# Patient Record
Sex: MALE | Race: WHITE | URBAN - METROPOLITAN AREA
[De-identification: names, ages, dates, MRNs, and addresses within clinical notes are randomized per-mention and may not be internally consistent; named-entity substitution may affect disease eponyms.]

---

## 2020-07-21 ENCOUNTER — OFFICE VISIT (OUTPATIENT)
Dept: INTERNAL MEDICINE CLINIC | Age: 45
End: 2020-07-21
Payer: COMMERCIAL

## 2020-07-21 VITALS
SYSTOLIC BLOOD PRESSURE: 120 MMHG | HEIGHT: 74 IN | TEMPERATURE: 97.6 F | WEIGHT: 188.2 LBS | OXYGEN SATURATION: 98 % | BODY MASS INDEX: 24.15 KG/M2 | DIASTOLIC BLOOD PRESSURE: 62 MMHG | RESPIRATION RATE: 14 BRPM | HEART RATE: 68 BPM

## 2020-07-21 PROCEDURE — 90471 IMMUNIZATION ADMIN: CPT | Performed by: INTERNAL MEDICINE

## 2020-07-21 PROCEDURE — 90715 TDAP VACCINE 7 YRS/> IM: CPT | Performed by: INTERNAL MEDICINE

## 2020-07-21 PROCEDURE — 99204 OFFICE O/P NEW MOD 45 MIN: CPT | Performed by: INTERNAL MEDICINE

## 2020-07-21 RX ORDER — NAPROXEN 500 MG/1
500 TABLET ORAL 2 TIMES DAILY WITH MEALS
Qty: 60 TABLET | Refills: 0 | Status: SHIPPED | OUTPATIENT
Start: 2020-07-21 | End: 2020-08-17

## 2020-07-21 SDOH — HEALTH STABILITY: MENTAL HEALTH: HOW MANY STANDARD DRINKS CONTAINING ALCOHOL DO YOU HAVE ON A TYPICAL DAY?: 3 OR 4

## 2020-07-21 SDOH — HEALTH STABILITY: MENTAL HEALTH: HOW OFTEN DO YOU HAVE A DRINK CONTAINING ALCOHOL?: 2-3 TIMES A WEEK

## 2020-07-21 ASSESSMENT — ENCOUNTER SYMPTOMS
NAUSEA: 0
PHOTOPHOBIA: 0
SINUS PRESSURE: 0
DIARRHEA: 0
COUGH: 0
EYE PAIN: 0
VOMITING: 0
WHEEZING: 0
SHORTNESS OF BREATH: 0
BACK PAIN: 0
CONSTIPATION: 0
COLOR CHANGE: 0
SINUS PAIN: 0
TROUBLE SWALLOWING: 0
ABDOMINAL PAIN: 0

## 2020-07-21 ASSESSMENT — PATIENT HEALTH QUESTIONNAIRE - PHQ9
1. LITTLE INTEREST OR PLEASURE IN DOING THINGS: 0
SUM OF ALL RESPONSES TO PHQ QUESTIONS 1-9: 0
SUM OF ALL RESPONSES TO PHQ QUESTIONS 1-9: 0
2. FEELING DOWN, DEPRESSED OR HOPELESS: 0
SUM OF ALL RESPONSES TO PHQ9 QUESTIONS 1 & 2: 0

## 2020-07-21 NOTE — PROGRESS NOTES
2020    Raleigh Llamas (:  1975) is a 39 y.o. male, here for evaluation of the following medical concerns and for CPE:    HPI    Bilateral elbow pain, numbness in lateral 2 digits. Lateral elbow. Bothering over last 2 months. Numbness only at night. Elbow pain not with activity but once he stops and rests. Occasionally takes aleve > has been seeing the chiropractor for it. Uses counterforce bracing some times. Doing stretches. Has been playing more golf lately and lifting more weights. Right thumb pain and swelling at 1st MCP. Mostly just sore. No redness. He is left handed. No other joints swollen or painful. Review of Systems   Constitutional: Negative for appetite change, fatigue, fever and unexpected weight change. No night sweats   HENT: Negative for congestion, ear pain, hearing loss, nosebleeds, sinus pressure, sinus pain, sneezing, tinnitus and trouble swallowing. Eyes: Negative for photophobia, pain and visual disturbance. Respiratory: Negative for cough, shortness of breath and wheezing. Cardiovascular: Negative for chest pain, palpitations and leg swelling. Gastrointestinal: Negative for abdominal pain, constipation, diarrhea, nausea and vomiting. Endocrine: Negative for cold intolerance, heat intolerance, polydipsia, polyphagia and polyuria. Genitourinary: Negative for difficulty urinating, dysuria, frequency, hematuria and urgency. Musculoskeletal: Positive for arthralgias and joint swelling. Negative for back pain, myalgias and neck pain. Skin: Negative for color change and rash. Allergic/Immunologic: Negative for environmental allergies, food allergies and immunocompromised state. Neurological: Negative for dizziness, syncope, speech difficulty, weakness, light-headedness, numbness and headaches. Hematological: Negative for adenopathy. Does not bruise/bleed easily. Psychiatric/Behavioral: Negative for dysphoric mood and sleep disturbance.  The patient is not nervous/anxious. Prior to Visit Medications    Not on File        No Known Allergies    History reviewed. No pertinent past medical history.     Past Surgical History:   Procedure Laterality Date    KNEE SURGERY Right 10/2014    meniscus    SHOULDER SURGERY Right 03/2014       Social History     Socioeconomic History    Marital status:      Spouse name: Not on file    Number of children: Not on file    Years of education: Not on file    Highest education level: Not on file   Occupational History    Not on file   Social Needs    Financial resource strain: Not on file    Food insecurity     Worry: Not on file     Inability: Not on file    Transportation needs     Medical: Not on file     Non-medical: Not on file   Tobacco Use    Smoking status: Never Smoker    Smokeless tobacco: Never Used   Substance and Sexual Activity    Alcohol use: Yes     Frequency: 2-3 times a week     Drinks per session: 3 or 4    Drug use: Never    Sexual activity: Yes     Partners: Female     Birth control/protection: Surgical   Lifestyle    Physical activity     Days per week: Not on file     Minutes per session: Not on file    Stress: Not on file   Relationships    Social connections     Talks on phone: Not on file     Gets together: Not on file     Attends Taoist service: Not on file     Active member of club or organization: Not on file     Attends meetings of clubs or organizations: Not on file     Relationship status: Not on file    Intimate partner violence     Fear of current or ex partner: Not on file     Emotionally abused: Not on file     Physically abused: Not on file     Forced sexual activity: Not on file   Other Topics Concern    Not on file   Social History Narrative    Not on file        Family History   Problem Relation Age of Onset    Hypertension Father     High Cholesterol Father     Alzheimer's Disease Paternal Grandmother        Vitals:    07/21/20 1515   BP: 120/62   Pulse: 68   Resp: 14   Temp: 97.6 °F (36.4 °C)   TempSrc: Temporal   SpO2: 98%   Weight: 188 lb 3.2 oz (85.4 kg)   Height: 6' 2\" (1.88 m)     Estimated body mass index is 24.16 kg/m² as calculated from the following:    Height as of this encounter: 6' 2\" (1.88 m). Weight as of this encounter: 188 lb 3.2 oz (85.4 kg). Physical Exam  Constitutional:       General: He is not in acute distress. Appearance: Normal appearance. HENT:      Head: Normocephalic and atraumatic. Right Ear: Tympanic membrane, ear canal and external ear normal.      Left Ear: Tympanic membrane, ear canal and external ear normal.      Nose: Nose normal. No congestion. Mouth/Throat:      Mouth: Mucous membranes are moist.      Pharynx: Oropharynx is clear. Eyes:      General: No scleral icterus. Conjunctiva/sclera: Conjunctivae normal.   Neck:      Musculoskeletal: Normal range of motion and neck supple. Vascular: No carotid bruit. Comments: No thyromegaly  Cardiovascular:      Rate and Rhythm: Normal rate and regular rhythm. Pulses: Normal pulses. Heart sounds: Normal heart sounds. No murmur. No friction rub. No gallop. Pulmonary:      Effort: Pulmonary effort is normal.      Breath sounds: Normal breath sounds. No wheezing, rhonchi or rales. Abdominal:      General: Abdomen is flat. Bowel sounds are normal.      Palpations: Abdomen is soft. Musculoskeletal: Normal range of motion. General: Tenderness (over lateral epicondyles) present. Right lower leg: No edema. Left lower leg: No edema. Comments: 1st MCP on right with slight swelling. Not TTP. Some mild grind. Lymphadenopathy:      Cervical: No cervical adenopathy. Skin:     General: Skin is warm and dry. Findings: No rash. Neurological:      General: No focal deficit present. Mental Status: He is alert. Mental status is at baseline.       Coordination: Coordination normal.      Gait: Gait normal.   Psychiatric:         Mood and Affect: Mood normal.         Behavior: Behavior normal.         ASSESSMENT/PLAN:  Tanisha Edwards was seen today for established new doctor. Diagnoses and all orders for this visit:    Lateral epicondylitis of both elbows  Trial 10 day course of NSAID, counterforce bracing for 4-6 weeks, eccentric exercises once pain free. Consider ortho referral if continues to bother for possible CSI. Numbness likely ulnar entrapment. Discussed keeping elbows straight in sleep.   -     naproxen (NAPROSYN) 500 MG tablet; Take 1 tablet by mouth 2 times daily (with meals)    Screening for diabetes mellitus (DM)  -     Cancel: Glucose, Fasting; Future    Encounter for lipid screening for cardiovascular disease  -     Lipid Panel; Future    Need for Tdap vaccination  -     Tdap (age 6y and older) IM (BOOSTRIX)    Vitamin D deficiency  -     Vitamin D 25 Hydroxy; Future    Leukopenia, unspecified type  Previously known  -     COMPREHENSIVE METABOLIC PANEL; Future  -     CBC Auto Differential; Future    Right Thumb Pain  Likely arthritis in setting of distant injury. If worsens will xray. Trial NSAIDs. Update Tdap    Annual physical exam  UTD on HM  Counseling done    Return in about 1 year (around 7/21/2021) for Physical.    An  electronic signature was used to authenticate this note.     --Charley Fleming MD on 7/21/2020 at 3:28 PM

## 2020-07-21 NOTE — PATIENT INSTRUCTIONS
Eccentric strengthening exercises for the tennis elbow    Naprosyn 500mg twice a day for 10 days.      Counter force brace

## 2020-07-28 DIAGNOSIS — E55.9 VITAMIN D DEFICIENCY: ICD-10-CM

## 2020-07-28 DIAGNOSIS — Z13.6 ENCOUNTER FOR LIPID SCREENING FOR CARDIOVASCULAR DISEASE: ICD-10-CM

## 2020-07-28 DIAGNOSIS — D72.819 LEUKOPENIA, UNSPECIFIED TYPE: ICD-10-CM

## 2020-07-28 DIAGNOSIS — Z13.220 ENCOUNTER FOR LIPID SCREENING FOR CARDIOVASCULAR DISEASE: ICD-10-CM

## 2020-07-28 LAB
A/G RATIO: 1.7 (ref 1.1–2.2)
ALBUMIN SERPL-MCNC: 4.4 G/DL (ref 3.4–5)
ALP BLD-CCNC: 69 U/L (ref 40–129)
ALT SERPL-CCNC: 27 U/L (ref 10–40)
ANION GAP SERPL CALCULATED.3IONS-SCNC: 19 MMOL/L (ref 3–16)
AST SERPL-CCNC: 27 U/L (ref 15–37)
BASOPHILS ABSOLUTE: 0 K/UL (ref 0–0.2)
BASOPHILS RELATIVE PERCENT: 1.3 %
BILIRUB SERPL-MCNC: 0.7 MG/DL (ref 0–1)
BUN BLDV-MCNC: 22 MG/DL (ref 7–20)
CALCIUM SERPL-MCNC: 9.5 MG/DL (ref 8.3–10.6)
CHLORIDE BLD-SCNC: 101 MMOL/L (ref 99–110)
CHOLESTEROL, TOTAL: 167 MG/DL (ref 0–199)
CO2: 21 MMOL/L (ref 21–32)
CREAT SERPL-MCNC: 1.2 MG/DL (ref 0.9–1.3)
EOSINOPHILS ABSOLUTE: 0.2 K/UL (ref 0–0.6)
EOSINOPHILS RELATIVE PERCENT: 6.9 %
GFR AFRICAN AMERICAN: >60
GFR NON-AFRICAN AMERICAN: >60
GLOBULIN: 2.6 G/DL
GLUCOSE BLD-MCNC: 82 MG/DL (ref 70–99)
HCT VFR BLD CALC: 44.7 % (ref 40.5–52.5)
HDLC SERPL-MCNC: 65 MG/DL (ref 40–60)
HEMOGLOBIN: 15.3 G/DL (ref 13.5–17.5)
LDL CHOLESTEROL CALCULATED: 83 MG/DL
LYMPHOCYTES ABSOLUTE: 1.3 K/UL (ref 1–5.1)
LYMPHOCYTES RELATIVE PERCENT: 41 %
MCH RBC QN AUTO: 32.8 PG (ref 26–34)
MCHC RBC AUTO-ENTMCNC: 34.1 G/DL (ref 31–36)
MCV RBC AUTO: 96.1 FL (ref 80–100)
MONOCYTES ABSOLUTE: 0.3 K/UL (ref 0–1.3)
MONOCYTES RELATIVE PERCENT: 9.5 %
NEUTROPHILS ABSOLUTE: 1.3 K/UL (ref 1.7–7.7)
NEUTROPHILS RELATIVE PERCENT: 41.3 %
PDW BLD-RTO: 12.9 % (ref 12.4–15.4)
PLATELET # BLD: 176 K/UL (ref 135–450)
PMV BLD AUTO: 8.7 FL (ref 5–10.5)
POTASSIUM SERPL-SCNC: 4.5 MMOL/L (ref 3.5–5.1)
RBC # BLD: 4.65 M/UL (ref 4.2–5.9)
SODIUM BLD-SCNC: 141 MMOL/L (ref 136–145)
TOTAL PROTEIN: 7 G/DL (ref 6.4–8.2)
TRIGL SERPL-MCNC: 93 MG/DL (ref 0–150)
VITAMIN D 25-HYDROXY: 54.6 NG/ML
VLDLC SERPL CALC-MCNC: 19 MG/DL
WBC # BLD: 3.1 K/UL (ref 4–11)

## 2020-07-29 DIAGNOSIS — Z11.4 ENCOUNTER FOR SCREENING FOR HIV: ICD-10-CM

## 2020-07-29 LAB
HIV AG/AB: NORMAL
HIV ANTIGEN: NORMAL
HIV-1 ANTIBODY: NORMAL
HIV-2 AB: NORMAL

## 2020-11-12 ENCOUNTER — TELEPHONE (OUTPATIENT)
Dept: INTERNAL MEDICINE CLINIC | Age: 45
End: 2020-11-12

## 2020-11-12 DIAGNOSIS — Z20.822 SUSPECTED COVID-19 VIRUS INFECTION: ICD-10-CM

## 2020-11-12 LAB — SARS-COV-2 ANTIBODY, TOTAL: NEGATIVE

## 2020-11-13 ENCOUNTER — OFFICE VISIT (OUTPATIENT)
Dept: PRIMARY CARE CLINIC | Age: 45
End: 2020-11-13
Payer: COMMERCIAL

## 2020-11-13 PROCEDURE — 99211 OFF/OP EST MAY X REQ PHY/QHP: CPT | Performed by: NURSE PRACTITIONER

## 2020-11-13 NOTE — PROGRESS NOTES
Deisy Cohen received a viral test for COVID-19. They were educated on isolation and quarantine as appropriate. For any symptoms, they were directed to seek care from their PCP, given contact information to establish with a doctor, directed to an urgent care or the emergency room.

## 2020-11-17 LAB — SARS-COV-2, NAA: NOT DETECTED

## 2021-05-05 DIAGNOSIS — M77.12 LATERAL EPICONDYLITIS OF BOTH ELBOWS: ICD-10-CM

## 2021-05-05 DIAGNOSIS — M77.11 LATERAL EPICONDYLITIS OF BOTH ELBOWS: ICD-10-CM

## 2021-05-05 NOTE — TELEPHONE ENCOUNTER
Last appointment: 7/21/2020  Next appointment: Per note patient ws due to follow up in 1 year  Last refill: 62 with 3 08/17/2020

## 2021-05-06 RX ORDER — NAPROXEN 500 MG/1
TABLET ORAL
Qty: 60 TABLET | Refills: 3 | Status: SHIPPED | OUTPATIENT
Start: 2021-05-06 | End: 2022-11-03

## 2022-11-03 ENCOUNTER — HOSPITAL ENCOUNTER (OUTPATIENT)
Dept: GENERAL RADIOLOGY | Age: 47
Discharge: HOME | End: 2022-11-03
Payer: COMMERCIAL

## 2022-11-03 ENCOUNTER — OFFICE VISIT (OUTPATIENT)
Dept: INTERNAL MEDICINE CLINIC | Age: 47
End: 2022-11-03
Payer: COMMERCIAL

## 2022-11-03 VITALS
DIASTOLIC BLOOD PRESSURE: 72 MMHG | HEART RATE: 54 BPM | SYSTOLIC BLOOD PRESSURE: 118 MMHG | RESPIRATION RATE: 16 BRPM | OXYGEN SATURATION: 98 % | WEIGHT: 200.6 LBS | HEIGHT: 74 IN | BODY MASS INDEX: 25.74 KG/M2

## 2022-11-03 DIAGNOSIS — R05.3 CHRONIC COUGH: ICD-10-CM

## 2022-11-03 DIAGNOSIS — Z11.59 ENCOUNTER FOR HEPATITIS C SCREENING TEST FOR LOW RISK PATIENT: ICD-10-CM

## 2022-11-03 DIAGNOSIS — Z12.11 COLON CANCER SCREENING: ICD-10-CM

## 2022-11-03 DIAGNOSIS — L82.1 SEBORRHEIC KERATOSES: ICD-10-CM

## 2022-11-03 DIAGNOSIS — Z23 NEED FOR VACCINATION: ICD-10-CM

## 2022-11-03 DIAGNOSIS — R09.89 TICKLE IN THROAT: ICD-10-CM

## 2022-11-03 DIAGNOSIS — Z00.00 ENCOUNTER FOR WELL ADULT EXAM WITHOUT ABNORMAL FINDINGS: Primary | ICD-10-CM

## 2022-11-03 DIAGNOSIS — Z13.220 ENCOUNTER FOR LIPID SCREENING FOR CARDIOVASCULAR DISEASE: ICD-10-CM

## 2022-11-03 DIAGNOSIS — Z13.1 SCREENING FOR DIABETES MELLITUS (DM): ICD-10-CM

## 2022-11-03 DIAGNOSIS — J31.2 CHRONIC SORE THROAT: ICD-10-CM

## 2022-11-03 DIAGNOSIS — Z13.6 ENCOUNTER FOR LIPID SCREENING FOR CARDIOVASCULAR DISEASE: ICD-10-CM

## 2022-11-03 PROCEDURE — 90471 IMMUNIZATION ADMIN: CPT | Performed by: INTERNAL MEDICINE

## 2022-11-03 PROCEDURE — G8482 FLU IMMUNIZE ORDER/ADMIN: HCPCS | Performed by: INTERNAL MEDICINE

## 2022-11-03 PROCEDURE — 71046 X-RAY EXAM CHEST 2 VIEWS: CPT

## 2022-11-03 PROCEDURE — 90674 CCIIV4 VAC NO PRSV 0.5 ML IM: CPT | Performed by: INTERNAL MEDICINE

## 2022-11-03 PROCEDURE — 99396 PREV VISIT EST AGE 40-64: CPT | Performed by: INTERNAL MEDICINE

## 2022-11-03 ASSESSMENT — PATIENT HEALTH QUESTIONNAIRE - PHQ9
SUM OF ALL RESPONSES TO PHQ9 QUESTIONS 1 & 2: 0
SUM OF ALL RESPONSES TO PHQ QUESTIONS 1-9: 0
2. FEELING DOWN, DEPRESSED OR HOPELESS: 0
SUM OF ALL RESPONSES TO PHQ QUESTIONS 1-9: 0
1. LITTLE INTEREST OR PLEASURE IN DOING THINGS: 0

## 2022-11-03 NOTE — PATIENT INSTRUCTIONS
Well Visit, Ages 25 to 72: Care Instructions  Well visits can help you stay healthy. Your doctor has checked your overall health and may have suggested ways to take good care of yourself. Your doctor also may have recommended tests. You can help prevent illness with healthy eating, good sleep, vaccinations, regular exercise, and other steps. Get the tests that you and your doctor decide on. Depending on your age and risks, examples might include screening for diabetes; hepatitis C; HIV; and cervical, breast, lung, and colon cancer. Screening helps find diseases before any symptoms appear. Eat healthy foods. Choose fruits, vegetables, whole grains, lean protein, and low-fat dairy foods. Limit saturated fat and reduce salt. Limit alcohol. Men should have no more than 2 drinks a day. Women should have no more than 1. For some people, no alcohol is the best choice. Exercise. Get at least 30 minutes of exercise on most days of the week. Walking can be a good choice. Reach and stay at your healthy weight. This will lower your risk for many health problems. Take care of your mental health. Try to stay connected with friends, family, and community, and find ways to manage stress. If you're feeling depressed or hopeless, talk to someone. A counselor can help. If you don't have a counselor, talk to your doctor. Talk to your doctor if you think you may have a problem with alcohol or drug use. This includes prescription medicines and illegal drugs. Avoid tobacco and nicotine: Don't smoke, vape, or chew. If you need help quitting, talk to your doctor. Practice safer sex. Getting tested, using condoms or dental dams, and limiting sex partners can help prevent STIs. Use birth control if it's important to you to prevent pregnancy. Talk with your doctor about your choices and what might be best for you. Prevent problems where you can.  Protect your skin from too much sun, wash your hands, brush your teeth twice a day, and wear a seat belt in the car. Where can you learn more? Go to https://[email or web address] org and sign in to your [employer] account. Enter P072 in the [facility] box to learn more about \"Well Visit, Ages 25 to 72: Care Instructions. \"     If you do not have an account, please click on the \"Sign Up Now\" link. Current as of: March 9, 2022               Content Version: 13.4  © 2332-3654 [employer]. Care instructions adapted under license by [facility] ([facility]). If you have questions about a medical condition or this instruction, always ask your healthcare professional. [address] any warranty or liability for your use of this information.

## 2022-11-03 NOTE — PROGRESS NOTES
History and Physical  [facility]  Internal Medicine  MD Leydi Elliott Harsh  YOB: 1975    Date of Service:  11/3/2022    Vitals:    11/03/22 0821   BP: 118/72   Pulse: 54   Resp: 16   SpO2: 98%   Weight: 200 lb 9.6 oz (91 kg)   Height: 6' 2\" (1.88 m)     Body mass index is 25.76 kg/m². Wt Readings from Last 3 Encounters:   11/03/22 200 lb 9.6 oz (91 kg)   07/21/20 188 lb 3.2 oz (85.4 kg)     BP Readings from Last 3 Encounters:   11/03/22 118/72   07/21/20 120/62        Chief Sahnte Mcbride is a 41 y.o. male who presents for complete physical examination. Chief Complaint   Patient presents with    Annual Exam       HPI:   Every day getting a little scratch in throat which will make him cough. Started Jun 2022 after being sick. Does not wake up at night. Exercise tolerance okay. Breathing fine. No postnasal drip. No history of asthma. There is no problem list on file for this patient. No Known Allergies  No outpatient medications have been marked as taking for the 11/3/22 encounter (Office Visit) with Mohit Hendrickson MD.       History reviewed. No pertinent past medical history. Past Surgical History:   Procedure Laterality Date    KNEE SURGERY Right 10/2014    meniscus    SHOULDER SURGERY Right 03/2014     Family History   Problem Relation Age of Onset    Hypertension Father     High Cholesterol Father     Alzheimer's Disease Paternal Grandmother        Review of Systems:  A comprehensive review of systems was negative except for what was noted in the HPI. Physical Exam  Constitutional:       General: He is not in acute distress. Appearance: Normal appearance. He is well-developed. He is not diaphoretic. HENT:      Head: Normocephalic and atraumatic.       Right Ear: Tympanic membrane, ear canal and external ear normal.      Left Ear: Tympanic membrane, ear canal and external ear normal.      Nose: Nose normal.      Mouth/Throat:      Mouth: Mucous membranes are moist.      Pharynx: Oropharynx is clear. Posterior oropharyngeal erythema (mild) present. No oropharyngeal exudate. Eyes:      General: No scleral icterus. Conjunctiva/sclera: Conjunctivae normal.      Pupils: Pupils are equal, round, and reactive to light. Neck:      Thyroid: No thyromegaly. Vascular: No carotid bruit or JVD. Comments: No JVD at 90 deg  No thyromegaly  Cardiovascular:      Rate and Rhythm: Normal rate and regular rhythm. Pulses: Normal pulses. Heart sounds: Normal heart sounds. No murmur heard. No friction rub. No gallop. Pulmonary:      Effort: Pulmonary effort is normal.      Breath sounds: Normal breath sounds. No wheezing or rales. Abdominal:      General: Bowel sounds are normal. There is no distension. Palpations: Abdomen is soft. There is no mass. Tenderness: There is no abdominal tenderness. There is no guarding or rebound. Musculoskeletal:         General: No tenderness or deformity. Normal range of motion. Cervical back: Normal range of motion and neck supple. Right lower leg: No edema. Left lower leg: No edema. Lymphadenopathy:      Cervical: No cervical adenopathy. Skin:     General: Skin is warm and dry. Findings: No lesion or rash. Comments: A few scattered seb kers on face   Neurological:      General: No focal deficit present. Mental Status: He is alert and oriented to person, place, and time. Cranial Nerves: No cranial nerve deficit (grossly intact). Sensory: No sensory deficit. Motor: No weakness.       Coordination: Coordination normal.      Gait: Gait normal.   Psychiatric:         Mood and Affect: Mood normal.         Behavior: Behavior normal.       Lipid panel:   Lab Results   Component Value Date    CHOL 167 07/28/2020    TRIG 93 07/28/2020    HDL 65 (H) 07/28/2020    LDLCALC 83 07/28/2020     Glucose:   Glucose (mg/dL)   Date Value   07/28/2020 82       No results found for this visit on 11/03/22. Preventive Care:  Self-testicular exams: yes  Eye exam within the past 2 years: yes  Dental exam every 6months: yes  Exercise:  5 days a week for 30 mins to an hour - various sport activities. Social History     Tobacco Use    Smoking status: Never    Smokeless tobacco: Never   Substance Use Topics    Alcohol use: Yes     Social History     Substance and Sexual Activity   Sexual Activity Yes    Partners: Female    Birth control/protection: Surgical       Advance Directive: N, <no information>    Immunization History   Administered Date(s) Administered    COVID-19, PFIZER Bivalent BOOSTER, (age 12y+), IM, 27 mcg/0.3 mL dose 09/12/2022    COVID-19, PFIZER PURPLE top, DILUTE for use, (age 15 y+), 30mcg/0.3mL 03/20/2021, 04/11/2021    Influenza, FLUCELVAX, (age 10 mo+), MDCK, PF, 0.5mL 11/03/2022    Tdap (Boostrix, Adacel) 07/21/2020       Health Maintenance   Topic Date Due    Depression Screen  Never done    Hepatitis C screen  Never done    Colorectal Cancer Screen  Never done    Lipids  07/28/2025    DTaP/Tdap/Td vaccine (3 - Td or Tdap) 07/21/2030    Flu vaccine  Completed    COVID-19 Vaccine  Completed    Hepatitis A vaccine  Aged Out    Hib vaccine  Aged Out    Meningococcal (ACWY) vaccine  Aged Out    Pneumococcal 0-64 years Vaccine  Aged Out    HIV screen  Discontinued          Assessment/Plan:  1. Encounter for well adult exam without abnormal findings  Care gaps identified and addressed  UTD on IMZ - flu shot today  Hep C screening  Lipids and A1C screening  Colon cancer screening discussed - colonoscopy referral  Mental health screenings performed  Sun protection  Healthy diet and exercise    -     Hepatitis C Antibody; Future  -     CBC with Auto Differential; Future  -     Comprehensive Metabolic Panel; Future  -     Lipid Panel; Future  -     Hemoglobin A1C; Future  2.  Encounter for hepatitis C screening test for low risk patient  -     Hepatitis C Antibody; Future  3. Encounter for lipid screening for cardiovascular disease  -     Lipid Panel; Future  4. Screening for diabetes mellitus (DM)  -     Hemoglobin A1C; Future  5. Colon cancer screening  -     [facility] Merlin, MD, Gastroenterology, [facility]  6. Chronic cough  -     XR CHEST STANDARD (2 VW); Future  7. Seborrheic keratoses  -     Lilyan Collet MD, Dermatology, [facility]  8. Need for vaccination  -     Influenza, FLUCELVAX, (age 10 mo+), IM, PF, 0.5 mL  9. Chronic sore throat  Comments:  Since illness. May have post viral cough. Some erythema on exam. May have silent reflux. Will get CXR and refer to ENT for eval.   Orders:  -     Oscar Hatch DO, Otolaryngology, [facility]  10.  Tickle in throat  -     [facility] Sly Maher DO, Otolaryngology, [facility]     Return in about 1 year (around 11/3/2023) for Physical.

## 2022-12-02 ENCOUNTER — OFFICE VISIT (OUTPATIENT)
Dept: ENT CLINIC | Age: 47
End: 2022-12-02

## 2022-12-02 VITALS
BODY MASS INDEX: 25.41 KG/M2 | WEIGHT: 198 LBS | HEIGHT: 74 IN | SYSTOLIC BLOOD PRESSURE: 118 MMHG | HEART RATE: 61 BPM | DIASTOLIC BLOOD PRESSURE: 77 MMHG

## 2022-12-02 DIAGNOSIS — R09.89 FOREIGN BODY SENSATION IN THROAT: ICD-10-CM

## 2022-12-02 DIAGNOSIS — R05.3 CHRONIC COUGH: Primary | ICD-10-CM

## 2022-12-02 DIAGNOSIS — K21.9 LARYNGOPHARYNGEAL REFLUX (LPR): ICD-10-CM

## 2022-12-02 RX ORDER — PANTOPRAZOLE SODIUM 40 MG/1
40 TABLET, DELAYED RELEASE ORAL DAILY
Qty: 30 TABLET | Refills: 1 | Status: SHIPPED | OUTPATIENT
Start: 2022-12-02

## 2022-12-02 ASSESSMENT — ENCOUNTER SYMPTOMS
PHOTOPHOBIA: 0
VOICE CHANGE: 0
SORE THROAT: 0
SINUS PRESSURE: 0
COUGH: 1
BLOOD IN STOOL: 0
STRIDOR: 0
COLOR CHANGE: 0
SINUS PAIN: 0
VOMITING: 0
CONSTIPATION: 0
SHORTNESS OF BREATH: 0
NAUSEA: 0
EYE ITCHING: 0
WHEEZING: 0
BACK PAIN: 0
EYE DISCHARGE: 0
DIARRHEA: 0
RHINORRHEA: 0
TROUBLE SWALLOWING: 0
FACIAL SWELLING: 0
CHOKING: 0

## 2022-12-02 NOTE — PROGRESS NOTES
[facility]  [address] [address] [facility], [address], [address]  P: [phone]  F: [phone]       Patient     Derick Flores  1975    ChiefComplaint     Chief Complaint   Patient presents with    New Patient     Patient is here today because he gets a tickle on the left side of his throat which makes him start to cough        History of Present Illness     Derick Flores is a pleasant 41 y.o. male who presents as a new patient for cough. For the past month or so he has been experiencing a tickle in the left side of his throat which triggering a cough. Its worse when he lays down at night. Denies any significant history of allergic rhinitis or acid reflux. He does drink occasionally. No significant history of tobacco use. Denies fevers, chills, night sweats or unexpected weight loss. Denies hemoptysis. Denies change in voice, dysphagia or odynophagia. Past Medical History     No past medical history on file.     Past Surgical History     Past Surgical History:   Procedure Laterality Date    COLONOSCOPY  2008    KNEE SURGERY Right 10/2014    meniscus    SHOULDER SURGERY Right 03/2014       Family History     Family History   Problem Relation Age of Onset    Hypertension Father     High Cholesterol Father     Alzheimer's Disease Paternal Grandmother        Social History     Social History     Socioeconomic History    Marital status:      Spouse name: Not on file    Number of children: Not on file    Years of education: Not on file    Highest education level: Not on file   Occupational History    Not on file   Tobacco Use    Smoking status: Never    Smokeless tobacco: Never   Substance and Sexual Activity    Alcohol use: Yes    Drug use: Never    Sexual activity: Yes     Partners: Female     Birth control/protection: Surgical   Other Topics Concern    Not on file   Social History Narrative    Not on file     Social Determinants of Health     Financial Resource Strain: Not on file Food Insecurity: Not on file   Transportation Needs: Not on file   Physical Activity: Not on file   Stress: Not on file   Social Connections: Not on file   Intimate Partner Violence: Not on file   Housing Stability: Not on file       Allergies     No Known Allergies    Medications     Current Outpatient Medications   Medication Sig Dispense Refill    pantoprazole (PROTONIX) 40 MG tablet Take 1 tablet by mouth daily 30 tablet 1     No current facility-administered medications for this visit. Review of Systems     Review of Systems   Constitutional:  Negative for activity change, appetite change, chills, diaphoresis, fatigue, fever and unexpected weight change. HENT:  Negative for congestion, dental problem, drooling, ear discharge, ear pain, facial swelling, hearing loss, mouth sores, nosebleeds, postnasal drip, rhinorrhea, sinus pressure, sinus pain, sneezing, sore throat, tinnitus, trouble swallowing and voice change. Eyes:  Negative for photophobia, discharge, itching and visual disturbance. Respiratory:  Positive for cough. Negative for choking, shortness of breath, wheezing and stridor. Gastrointestinal:  Negative for blood in stool, constipation, diarrhea, nausea and vomiting. Endocrine: Negative for cold intolerance, heat intolerance, polyphagia and polyuria. Musculoskeletal:  Negative for back pain, gait problem, neck pain and neck stiffness. Skin:  Negative for color change, pallor, rash and wound. Neurological:  Negative for dizziness, syncope, facial asymmetry, speech difficulty, light-headedness, numbness and headaches. Hematological:  Negative for adenopathy. Does not bruise/bleed easily. Psychiatric/Behavioral:  Negative for agitation, confusion and sleep disturbance. PhysicalExam     Vitals:    12/02/22 1130   BP: 118/77   Pulse: 61       Physical Exam  Constitutional:       Appearance: He is well-developed. HENT:      Head: Normocephalic and atraumatic.  Not macrocephalic and not microcephalic. No raccoon eyes, Du's sign, abrasion, contusion, right periorbital erythema, left periorbital erythema or laceration. Hair is normal.      Jaw: No trismus. Right Ear: Hearing, tympanic membrane and external ear normal. No decreased hearing noted. No drainage, swelling or tenderness. No middle ear effusion. No mastoid tenderness. Tympanic membrane is not perforated, retracted or bulging. Tympanic membrane has normal mobility. Left Ear: Hearing, tympanic membrane and external ear normal. No decreased hearing noted. No drainage, swelling or tenderness. No middle ear effusion. No mastoid tenderness. Tympanic membrane is not perforated, retracted or bulging. Tympanic membrane has normal mobility. Nose: Septal deviation present. No nasal deformity, laceration, mucosal edema or rhinorrhea. Right Sinus: No maxillary sinus tenderness or frontal sinus tenderness. Left Sinus: No maxillary sinus tenderness or frontal sinus tenderness. Mouth/Throat:      Mouth: Mucous membranes are not pale, not dry and not cyanotic. No lacerations or oral lesions. Dentition: Normal dentition. No dental caries or dental abscesses. Pharynx: Uvula midline. No oropharyngeal exudate, posterior oropharyngeal erythema or uvula swelling. Tonsils: No tonsillar abscesses. Eyes:      General: Lids are normal.         Right eye: No discharge. Left eye: No discharge. Extraocular Movements:      Right eye: Normal extraocular motion and no nystagmus. Left eye: Normal extraocular motion and no nystagmus. Conjunctiva/sclera:      Right eye: No chemosis or exudate. Left eye: No chemosis or exudate. Neck:      Thyroid: No thyroid mass or thyromegaly. Vascular: Normal carotid pulses. Trachea: No tracheal tenderness or tracheal deviation. Cardiovascular:      Rate and Rhythm: Normal rate and regular rhythm.    Pulmonary:      Effort: No tachypnea, bradypnea or respiratory distress. Breath sounds: No stridor. Musculoskeletal:      Right shoulder: Normal range of motion. Cervical back: Neck supple. Lymphadenopathy:      Head:      Right side of head: No submental, submandibular, tonsillar, preauricular, posterior auricular or occipital adenopathy. Left side of head: No submental, submandibular, tonsillar, preauricular, posterior auricular or occipital adenopathy. Cervical: No cervical adenopathy. Right cervical: No superficial, deep or posterior cervical adenopathy. Left cervical: No superficial, deep or posterior cervical adenopathy. Skin:     General: Skin is warm and dry. Findings: No bruising, erythema, laceration, lesion or rash. Neurological:      Mental Status: He is alert and oriented to person, place, and time. Cranial Nerves: No cranial nerve deficit. Psychiatric:         Speech: Speech normal.         Behavior: Behavior normal.         Procedure     Flexible Laryngoscopy    Pre op: Cough. Procedure : Flexible Laryngoscopy  Surgeon: Jenny Erickson DO  Anesthesia: Afrin with 4% lidocaine  Indication: Laryngeal mirror examination was not tolerated due to gag reflex  Description:  The scope was passed along the floor of the left naris to the level of the larynx. There was no evidence of concerning masses or lesions of the base of tongue, vallecula, epiglottis, aryepiglottic folds, arytenoids, false vocal folds, true vocal folds, or pyriform sinuses. True vocal folds exhibited symmetric motion bilaterally without evidence of paralysis or paresis. The scope was removed. The patient tolerated the procedure without difficulty. There were no complications. Pertinent findings: Moderate amount of white frothy mucus secretions in the posterior oropharyngeal wall. Mild postcricoid edema and interarytenoid pachydermia. Assessment and Plan     1.  Chronic cough  Etiology of the patient's cough is not clear. Given that it worsens at nighttime when laying down, the white frothy secretions and postcricoid edema, I am suspicious for laryngopharyngeal reflux. We will do a trial of pantoprazole and Gaviscon. Proper administration risks discussed. Follow-up in 2 months. If symptoms persist, consider chest x-ray. - pantoprazole (PROTONIX) 40 MG tablet; Take 1 tablet by mouth daily  Dispense: 30 tablet; Refill: 1    2. Foreign body sensation in throat      3. Laryngopharyngeal reflux (LPR)    - pantoprazole (PROTONIX) 40 MG tablet; Take 1 tablet by mouth daily  Dispense: 30 tablet; Refill: 1    Return in about 2 months (around 2/2/2023). Portions of this note were dictated using Dragon.  There may be linguistic errors secondary to the use of this program.

## 2022-12-24 DIAGNOSIS — K21.9 LARYNGOPHARYNGEAL REFLUX (LPR): ICD-10-CM

## 2022-12-24 DIAGNOSIS — R05.3 CHRONIC COUGH: ICD-10-CM

## 2022-12-27 RX ORDER — PANTOPRAZOLE SODIUM 40 MG/1
TABLET, DELAYED RELEASE ORAL
Qty: 30 TABLET | Refills: 1 | Status: SHIPPED | OUTPATIENT
Start: 2022-12-27

## 2023-01-10 DIAGNOSIS — K21.9 LARYNGOPHARYNGEAL REFLUX (LPR): ICD-10-CM

## 2023-01-10 DIAGNOSIS — R05.3 CHRONIC COUGH: ICD-10-CM

## 2023-01-11 RX ORDER — PANTOPRAZOLE SODIUM 40 MG/1
TABLET, DELAYED RELEASE ORAL
Qty: 90 TABLET | Refills: 1 | Status: SHIPPED | OUTPATIENT
Start: 2023-01-11

## 2023-03-10 ENCOUNTER — OFFICE VISIT (OUTPATIENT)
Dept: DERMATOLOGY | Age: 48
End: 2023-03-10
Payer: COMMERCIAL

## 2023-03-10 DIAGNOSIS — L82.1 OTHER SEBORRHEIC KERATOSIS: Primary | ICD-10-CM

## 2023-03-10 DIAGNOSIS — L81.4 LENTIGINES: ICD-10-CM

## 2023-03-10 PROCEDURE — G8482 FLU IMMUNIZE ORDER/ADMIN: HCPCS | Performed by: INTERNAL MEDICINE

## 2023-03-10 PROCEDURE — G8427 DOCREV CUR MEDS BY ELIG CLIN: HCPCS | Performed by: INTERNAL MEDICINE

## 2023-03-10 PROCEDURE — G8419 CALC BMI OUT NRM PARAM NOF/U: HCPCS | Performed by: INTERNAL MEDICINE

## 2023-03-10 PROCEDURE — 99203 OFFICE O/P NEW LOW 30 MIN: CPT | Performed by: INTERNAL MEDICINE

## 2023-03-10 PROCEDURE — 1036F TOBACCO NON-USER: CPT | Performed by: INTERNAL MEDICINE

## 2023-03-10 NOTE — PATIENT INSTRUCTIONS
Thank you for visiting [id number] [facility] today! Please follow the instructions below as we discussed in clinic:      The spots on your face are benign seborrheic keratoses    SUNSCREENS: UVA and UVB PROTECTION    Sunlight consists of two types of light that can cause or worsen most skin problems:    UVA (ultraviolet A)  UVB (ultraviolet B)   Causes brown spots/sun spots Causes skin cancer   Causes wrinkles Causes sunburn   Causes aging Responsible for tanning    Worsens rosacea Strongest in summer    Less variation with seasons Peak hours 10am-2pm   All year round  SPF rates UVB protection    All day strong    No rating system available     Passes through glass and clouds      *Sunscreens that block both UVA and UVB light contain:  Zinc Oxide (should contain at least 5% zinc oxide)  Titanium Dioxide  Parsol or Avobenzone (additives improve stability of Avobenzone)  There are other UVA blocking ingredients but they are not as complete as these three. Tips/Suggestions  1) Always use sunscreens with SPF of 30 or higher  2) Make sure the sunscreen has zinc oxide or other UVA block such as Helioplex or Anthelios in product  3) Remember there is no \"safe UV light:. ..so there is no such thing as a safe suntan. 4) Apply sunscreen daily (even in winter and on cloudy days) and reapply every 2 to 4 hours depending on activity. 5) Approximately one ounce (a shot glass) is necessary to adequately cover the entire body. 6) Approximately one teaspoon is necessary to adequately cover the face    TINTED SUNSCREENS  - La Roche Posay. Anthelios mineral tinted sunscreen. SPF. 50  Avene. [place] UV Mineral Multi-Defense Tinted Sunscreen SPF 50+   Avene. Mineral Tinted Compact SPF 48. Tizo 3 facial primer tinted SPF 40  Eltamd Uv Glow Tinted Broad-Spectrum Spf 36  Eltamd Uv Restore Tinted Broad-Spectrum Spf 40   Eltamd Uv Physical Broad-Spectrum Spf 41   Eltamd Uv Elements Broad-Spectrum Spf 44   Dermablend. Cover Creme. High-performance cream foundation for maximum coverage SPF 30   Vichy. [employer] Mica Tinted 100% Mineral Sunscreen SPF 60%. Vichy. [address] [employer]. [address]. Isdin. Eryfotona Ageless. Ultralight tinted mineral sunscreen. Isdin. Isdinceutics Mineral Brush. Supergoop Mineral CC cream (Comes in several shades, friendly for pigmented skin)  CoTz Flawless Complexion SPF 50 tinted  CoTz Face Prime and Protect SPF 40 tinted    CHEMICAL SUNSCREEN  - La Roche-Posay Anthelios Melt-in Milk Body Face Sunscreen Lotion Broad Spectrum SPF 61 or 100   - La Roche-Posay ANTHELIOS COOLING WATER SUNSCREEN LOTION SPF 60  - La Roche-Posay ANTHELIOS LOTION SPRAY SUNSCREEN SPF 60  - La Roche-Posay ANTHELIOS ACTIVEWEAR SPORT SUNSCREEN LOTION SPF 60    MOISTURIZER WITH CHEMICAL SUNSCREEN NON-COMEDOGENIC  - CeraVe Facial Moisturizing Lotion AM with Sunscreen, Broad Spectrum SPF 30: Ceramides/niacinamide/HA. - CeraVe Ultra-Light Moisturizing Lotion with Sunscreen, Broad Spectrum SPF 30. Ceramides/HA   - La Roche-Posay Toleriane Double Repair Moisturizer SPF 27. Ceramine/Niacinamide. Very dry skin  - Eucerin Daily Protection. Moisturizing Face Lotion Sunscreen SPF 30  - Cetaphil® PRO Dermacontrol. Oil Absorbing Moisturizer SPF 30: Very oily skin  - Cetaphil® Daily Facial Moisturizer SPF 48: Dry skin  - Neutrogena Visibly Even Daily Moisturizer with Sunscreen, Broad Spectrum SPF [place] Shield Water Gel Sunscreen, Broad Spectrum SPF 25  - Aveeno Positively Radiant Daily Moisturizer, Broad Spectrum SPF 30    MOISTURIZER WITH PHYSICAL SUNSCREEN. NON-COMEDOGENIC  - Neutrogena. Ultra-Calming Daily Adam Leon MD UV Physical Broad Spectrum SPF 39. Water resistant.  - La Roche-Posay Anthelios SPF 50 Ultra Light Mineral Sunscreen Fluid.  Water resistant  - La Roche-Posay Anthelios 100% Mineral Sunscreen Moisturizer with Hyaluronic Acid  - COOLA Mineral Face SPF 30 Matte Tint Moisturizer. Water resistant  - Avene SPF 50+ Mineral Light Hydrating Sunscreen Lotion. Water resistant. - CeraVe Skin Renewing Day Cream Broad Spectrum SPF 27  - Aveeno Ultra-Calming Daily Moisturizer Broad Spectrum SPF 30    The following are some examples of vendors of sun protective clothing:  - Coolibar (www.coolibar. SpendCrowd)  - Sun Precautions (www.sunprecautions. SpendCrowd)  - Sunday Afternoons (www.sundayaUnitrends Software. com)  Jenny Simons (www.Ecutronic Technologies)  - [employer] Labs (www.SozializeMe. SpendCrowd)    Remember the best sunscreen is whichever one you will wear every day!

## 2023-03-10 NOTE — PROGRESS NOTES
[facility]  Francisco Molina MD  [phone]    Date of Visit: 3/10/2023    Jan Stringer is a 41 y.o. male who presents for skin lessions. New pt    Chief Complaint:   Chief Complaint   Patient presents with    Skin Lesion     Spots on side of face        History of Present Illness:    Concern: Skin lesions  Location: L cheek  Duration:  Many years  Symptoms: None  Previous treatments:  None  -Maybe had one removed by derm in past and it came back    Patient denies any other new or changing skin lesions. They would like all of their skin lesions to be evaluated for skin cancer on face    *Personal history of skin cancer: None  *Family history of skin cancer: None     Review of Systems:  Gen: Feels well, good sense of health. Past Medical History, Family History, Surgical History, Medications and Allergies reviewed. History reviewed. No pertinent past medical history. Past Surgical History:   Procedure Laterality Date    COLONOSCOPY  2008    KNEE SURGERY Right 10/2014    meniscus    SHOULDER SURGERY Right 03/2014       No Known Allergies  Outpatient Medications Marked as Taking for the 3/10/23 encounter (Office Visit) with Terrence De La Rosa MD   Medication Sig Dispense Refill    pantoprazole (PROTONIX) 40 MG tablet TAKE 1 TABLET BY MOUTH EVERY DAY 90 tablet 1         Physical Examination   No acute distress. Mood clear/affect appropriate. Alert and oriented. Mucous membranes moist.  Sclera anicteric. Visible skin exam was conducted to include the scalp, face, lips, lids/conjunctiva, ears, neck, right and left hands and forearms and was normal with the following exceptions:   -Stuck on brown papules x3 on L cheek and x1 on R cheek  - Multiple tan to light brown macules on face and arms in a photo-distributed pattern    Assessment and Plan     1. Other seborrheic keratosis  -Benign, reassurance     2. Lentigines  -Benign, reassurance   - Reviewed relationship with sun exposure. Reviewed lentigines are consistent with chronic actinic damage which increases risk of development of future skin cancers. - Rec daily sunscreen with SPF 30 + broad spectrum, monthly self-skin checks     RTC PRN; annually for FBSE  Return to clinic sooner for any new or changing lesions    Note is transcribed using voice recognition software. Inadvertent computerized transcription errors may be present.     Sameer Dukes MD

## 2023-09-24 DIAGNOSIS — K21.9 LARYNGOPHARYNGEAL REFLUX (LPR): ICD-10-CM

## 2023-09-24 DIAGNOSIS — R05.3 CHRONIC COUGH: ICD-10-CM

## 2023-09-25 RX ORDER — PANTOPRAZOLE SODIUM 40 MG/1
TABLET, DELAYED RELEASE ORAL
Qty: 90 TABLET | Refills: 1 | OUTPATIENT
Start: 2023-09-25

## 2024-11-05 SDOH — HEALTH STABILITY: PHYSICAL HEALTH: ON AVERAGE, HOW MANY DAYS PER WEEK DO YOU ENGAGE IN MODERATE TO STRENUOUS EXERCISE (LIKE A BRISK WALK)?: 4 DAYS

## 2024-11-05 SDOH — HEALTH STABILITY: PHYSICAL HEALTH: ON AVERAGE, HOW MANY MINUTES DO YOU ENGAGE IN EXERCISE AT THIS LEVEL?: 60 MIN

## 2024-11-06 ENCOUNTER — OFFICE VISIT (OUTPATIENT)
Dept: INTERNAL MEDICINE CLINIC | Age: 49
End: 2024-11-06

## 2024-11-06 VITALS
HEART RATE: 71 BPM | OXYGEN SATURATION: 97 % | BODY MASS INDEX: 25.41 KG/M2 | HEIGHT: 74 IN | SYSTOLIC BLOOD PRESSURE: 124 MMHG | WEIGHT: 198 LBS | DIASTOLIC BLOOD PRESSURE: 82 MMHG

## 2024-11-06 DIAGNOSIS — Z23 NEED FOR IMMUNIZATION AGAINST INFLUENZA: ICD-10-CM

## 2024-11-06 DIAGNOSIS — Z12.11 SCREEN FOR COLON CANCER: ICD-10-CM

## 2024-11-06 DIAGNOSIS — Z76.89 ENCOUNTER TO ESTABLISH CARE: Primary | ICD-10-CM

## 2024-11-06 DIAGNOSIS — Z13.1 ENCOUNTER FOR SCREENING EXAMINATION FOR IMPAIRED GLUCOSE REGULATION AND DIABETES MELLITUS: ICD-10-CM

## 2024-11-06 DIAGNOSIS — Z13.220 SCREENING, LIPID: ICD-10-CM

## 2024-11-06 DIAGNOSIS — Z12.5 SCREENING PSA (PROSTATE SPECIFIC ANTIGEN): ICD-10-CM

## 2024-11-06 DIAGNOSIS — Z11.59 SCREENING FOR VIRAL DISEASE: ICD-10-CM

## 2024-11-06 RX ORDER — METHYLPREDNISOLONE 4 MG/1
4 TABLET ORAL SEE ADMIN INSTRUCTIONS
COMMUNITY
Start: 2024-10-01 | End: 2024-11-06

## 2024-11-06 SDOH — ECONOMIC STABILITY: FOOD INSECURITY: WITHIN THE PAST 12 MONTHS, YOU WORRIED THAT YOUR FOOD WOULD RUN OUT BEFORE YOU GOT MONEY TO BUY MORE.: NEVER TRUE

## 2024-11-06 SDOH — ECONOMIC STABILITY: INCOME INSECURITY: HOW HARD IS IT FOR YOU TO PAY FOR THE VERY BASICS LIKE FOOD, HOUSING, MEDICAL CARE, AND HEATING?: NOT HARD AT ALL

## 2024-11-06 SDOH — ECONOMIC STABILITY: FOOD INSECURITY: WITHIN THE PAST 12 MONTHS, THE FOOD YOU BOUGHT JUST DIDN'T LAST AND YOU DIDN'T HAVE MONEY TO GET MORE.: NEVER TRUE

## 2024-11-06 ASSESSMENT — ENCOUNTER SYMPTOMS
DIARRHEA: 0
SORE THROAT: 0
SHORTNESS OF BREATH: 0
TROUBLE SWALLOWING: 0
RHINORRHEA: 0
NAUSEA: 0
COUGH: 0
ABDOMINAL PAIN: 0

## 2024-11-06 ASSESSMENT — PATIENT HEALTH QUESTIONNAIRE - PHQ9
SUM OF ALL RESPONSES TO PHQ9 QUESTIONS 1 & 2: 0
1. LITTLE INTEREST OR PLEASURE IN DOING THINGS: NOT AT ALL
2. FEELING DOWN, DEPRESSED OR HOPELESS: NOT AT ALL
SUM OF ALL RESPONSES TO PHQ QUESTIONS 1-9: 0

## 2024-11-06 NOTE — PROGRESS NOTES
Jason Salus (:  1975) is a 49 y.o. male, here for evaluation of the following chief complaint(s):    New Patient      ASSESSMENT/PLAN:  1. Encounter to establish care  2. Need for immunization against influenza  -     Influenza, FLUCELVAX Trivalent, (age 6 mo+) IM, Preservative Free, 0.5mL  3. Screen for colon cancer  -     AFL - Dwight Mai MD, Gastroenterology, [facility]  4. Screening PSA (prostate specific antigen)  -     PSA Screening; Future  5. Screening, lipid  -     Lipid Panel; Future  6. Encounter for screening examination for impaired glucose regulation and diabetes mellitus  -     Comprehensive Metabolic Panel; Future  7. Screening for viral disease  -     Hepatitis B Surface Antibody; Future      Return in about 1 year (around 2025).    SUBJECTIVE/OBJECTIVE:  HPI  Patient is here to establish care.  Chart reviewed and updated.  He has no complaints.    Review of Systems   Constitutional:  Negative for fatigue and fever.   HENT:  Negative for rhinorrhea, sore throat and trouble swallowing.    Eyes:  Negative for visual disturbance.   Respiratory:  Negative for cough and shortness of breath.    Cardiovascular:  Negative for chest pain and palpitations.   Gastrointestinal:  Negative for abdominal pain, diarrhea and nausea.   Genitourinary:  Negative for decreased urine volume, dysuria and frequency.   Musculoskeletal:  Negative for arthralgias and myalgias.   Skin:  Negative for rash.   Allergic/Immunologic: Negative for immunocompromised state.   Neurological:  Negative for dizziness, numbness and headaches.   Hematological:  Does not bruise/bleed easily.   Psychiatric/Behavioral:  Negative for dysphoric mood and sleep disturbance. The patient is not nervous/anxious.        History reviewed. No pertinent past medical history.    No current outpatient medications on file.     No current facility-administered medications for this visit.       Physical Exam  Vitals and nursing note

## 2024-11-07 LAB
ALBUMIN SERPL-MCNC: 4.6 G/DL (ref 3.4–5)
ALBUMIN/GLOB SERPL: 2 {RATIO} (ref 1.1–2.2)
ALP SERPL-CCNC: 88 U/L (ref 40–129)
ALT SERPL-CCNC: 22 U/L (ref 10–40)
ANION GAP SERPL CALCULATED.3IONS-SCNC: 11 MMOL/L (ref 3–16)
AST SERPL-CCNC: 27 U/L (ref 15–37)
BILIRUB SERPL-MCNC: 0.4 MG/DL (ref 0–1)
BUN SERPL-MCNC: 17 MG/DL (ref 7–20)
CALCIUM SERPL-MCNC: 9.3 MG/DL (ref 8.3–10.6)
CHLORIDE SERPL-SCNC: 102 MMOL/L (ref 99–110)
CHOLEST SERPL-MCNC: 190 MG/DL (ref 0–199)
CO2 SERPL-SCNC: 25 MMOL/L (ref 21–32)
CREAT SERPL-MCNC: 1.2 MG/DL (ref 0.9–1.3)
GFR SERPLBLD CREATININE-BSD FMLA CKD-EPI: 75 ML/MIN/{1.73_M2}
GLUCOSE SERPL-MCNC: 74 MG/DL (ref 70–99)
HBV SURFACE AB SERPL IA-ACNC: <3.5 MIU/ML
HDLC SERPL-MCNC: 73 MG/DL (ref 40–60)
LDLC SERPL CALC-MCNC: 106 MG/DL
POTASSIUM SERPL-SCNC: 4.3 MMOL/L (ref 3.5–5.1)
PROT SERPL-MCNC: 6.9 G/DL (ref 6.4–8.2)
PSA SERPL DL<=0.01 NG/ML-MCNC: 0.54 NG/ML (ref 0–4)
SODIUM SERPL-SCNC: 138 MMOL/L (ref 136–145)
TRIGL SERPL-MCNC: 53 MG/DL (ref 0–150)
VLDLC SERPL CALC-MCNC: 11 MG/DL

## 2025-02-13 ENCOUNTER — OFFICE VISIT (OUTPATIENT)
Dept: INTERNAL MEDICINE CLINIC | Age: 50
End: 2025-02-13

## 2025-02-13 VITALS
OXYGEN SATURATION: 97 % | SYSTOLIC BLOOD PRESSURE: 122 MMHG | DIASTOLIC BLOOD PRESSURE: 80 MMHG | WEIGHT: 195 LBS | HEIGHT: 74 IN | BODY MASS INDEX: 25.03 KG/M2 | HEART RATE: 61 BPM

## 2025-02-13 DIAGNOSIS — Z01.818 PREOP EXAMINATION: Primary | ICD-10-CM

## 2025-02-13 SDOH — ECONOMIC STABILITY: FOOD INSECURITY: WITHIN THE PAST 12 MONTHS, YOU WORRIED THAT YOUR FOOD WOULD RUN OUT BEFORE YOU GOT MONEY TO BUY MORE.: NEVER TRUE

## 2025-02-13 SDOH — ECONOMIC STABILITY: FOOD INSECURITY: WITHIN THE PAST 12 MONTHS, THE FOOD YOU BOUGHT JUST DIDN'T LAST AND YOU DIDN'T HAVE MONEY TO GET MORE.: NEVER TRUE

## 2025-02-13 ASSESSMENT — ENCOUNTER SYMPTOMS
TROUBLE SWALLOWING: 0
ABDOMINAL PAIN: 0
SHORTNESS OF BREATH: 0
SORE THROAT: 0
RHINORRHEA: 0
NAUSEA: 0
COUGH: 0
DIARRHEA: 0

## 2025-02-13 ASSESSMENT — PATIENT HEALTH QUESTIONNAIRE - PHQ9
2. FEELING DOWN, DEPRESSED OR HOPELESS: NOT AT ALL
1. LITTLE INTEREST OR PLEASURE IN DOING THINGS: NOT AT ALL
SUM OF ALL RESPONSES TO PHQ QUESTIONS 1-9: 0
SUM OF ALL RESPONSES TO PHQ9 QUESTIONS 1 & 2: 0